# Patient Record
Sex: MALE | Race: WHITE | NOT HISPANIC OR LATINO
[De-identification: names, ages, dates, MRNs, and addresses within clinical notes are randomized per-mention and may not be internally consistent; named-entity substitution may affect disease eponyms.]

---
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
## 2025-01-24 ENCOUNTER — HOSPITAL ENCOUNTER (EMERGENCY)
Facility: HOSPITAL | Age: 20
Discharge: HOME | End: 2025-01-24
Attending: EMERGENCY MEDICINE
Payer: COMMERCIAL

## 2025-01-24 ENCOUNTER — APPOINTMENT (OUTPATIENT)
Dept: GENERAL RADIOLOGY | Facility: HOSPITAL | Age: 20
End: 2025-01-24
Attending: EMERGENCY MEDICINE
Payer: COMMERCIAL

## 2025-01-24 VITALS
DIASTOLIC BLOOD PRESSURE: 85 MMHG | TEMPERATURE: 98 F | HEIGHT: 63 IN | SYSTOLIC BLOOD PRESSURE: 139 MMHG | BODY MASS INDEX: 30.12 KG/M2 | OXYGEN SATURATION: 97 % | HEART RATE: 107 BPM | WEIGHT: 170 LBS | RESPIRATION RATE: 16 BRPM

## 2025-01-24 DIAGNOSIS — F41.9 ANXIETY: ICD-10-CM

## 2025-01-24 DIAGNOSIS — R07.9 CHEST PAIN OF UNCERTAIN ETIOLOGY: Primary | ICD-10-CM

## 2025-01-24 DIAGNOSIS — G47.00 INSOMNIA, UNSPECIFIED TYPE: ICD-10-CM

## 2025-01-24 LAB
ALBUMIN SERPL-MCNC: 5 G/DL (ref 3.2–4.8)
ALBUMIN/GLOB SERPL: 1.3 {RATIO} (ref 1–2)
ALP LIVER SERPL-CCNC: 95 U/L
ALT SERPL-CCNC: 60 U/L
AMPHET UR QL SCN: NEGATIVE
ANION GAP SERPL CALC-SCNC: 12 MMOL/L (ref 0–18)
AST SERPL-CCNC: 40 U/L (ref ?–34)
BASOPHILS # BLD AUTO: 0.06 X10(3) UL (ref 0–0.2)
BASOPHILS NFR BLD AUTO: 0.4 %
BENZODIAZ UR QL SCN: NEGATIVE
BILIRUB SERPL-MCNC: 1.4 MG/DL (ref 0.3–1.2)
BILIRUB UR QL STRIP.AUTO: NEGATIVE
BUN BLD-MCNC: 12 MG/DL (ref 9–23)
CALCIUM BLD-MCNC: 10 MG/DL (ref 8.7–10.6)
CHLORIDE SERPL-SCNC: 101 MMOL/L (ref 98–112)
CLARITY UR REFRACT.AUTO: CLEAR
CO2 SERPL-SCNC: 25 MMOL/L (ref 21–32)
COCAINE UR QL: NEGATIVE
COLOR UR AUTO: YELLOW
CREAT BLD-MCNC: 0.87 MG/DL
CREAT UR-SCNC: 271.3 MG/DL
D DIMER PPP FEU-MCNC: <0.27 UG/ML FEU (ref ?–0.5)
EGFRCR SERPLBLD CKD-EPI 2021: 126 ML/MIN/1.73M2 (ref 60–?)
EOSINOPHIL # BLD AUTO: 0.07 X10(3) UL (ref 0–0.7)
EOSINOPHIL NFR BLD AUTO: 0.5 %
ERYTHROCYTE [DISTWIDTH] IN BLOOD BY AUTOMATED COUNT: 12.6 %
ETHANOL SERPL-MCNC: <3 MG/DL (ref ?–3)
FENTANYL UR QL SCN: NEGATIVE
GLOBULIN PLAS-MCNC: 3.8 G/DL (ref 2–3.5)
GLUCOSE BLD-MCNC: 83 MG/DL (ref 70–99)
GLUCOSE UR STRIP.AUTO-MCNC: NORMAL MG/DL
HCT VFR BLD AUTO: 45.4 %
HGB BLD-MCNC: 15.6 G/DL
IMM GRANULOCYTES # BLD AUTO: 0.05 X10(3) UL (ref 0–1)
IMM GRANULOCYTES NFR BLD: 0.4 %
KETONES UR STRIP.AUTO-MCNC: 60 MG/DL
LEUKOCYTE ESTERASE UR QL STRIP.AUTO: NEGATIVE
LYMPHOCYTES # BLD AUTO: 2.94 X10(3) UL (ref 1–4)
LYMPHOCYTES NFR BLD AUTO: 20.9 %
MCH RBC QN AUTO: 27.9 PG (ref 26–34)
MCHC RBC AUTO-ENTMCNC: 34.4 G/DL (ref 31–37)
MCV RBC AUTO: 81.2 FL
MDMA UR QL SCN: NEGATIVE
MONOCYTES # BLD AUTO: 0.95 X10(3) UL (ref 0.1–1)
MONOCYTES NFR BLD AUTO: 6.7 %
NEUTROPHILS # BLD AUTO: 10.03 X10 (3) UL (ref 1.5–7.7)
NEUTROPHILS # BLD AUTO: 10.03 X10(3) UL (ref 1.5–7.7)
NEUTROPHILS NFR BLD AUTO: 71.1 %
NITRITE UR QL STRIP.AUTO: NEGATIVE
OPIATES UR QL SCN: NEGATIVE
OSMOLALITY SERPL CALC.SUM OF ELEC: 285 MOSM/KG (ref 275–295)
OXYCODONE UR QL SCN: NEGATIVE
PH UR STRIP.AUTO: 5.5 [PH] (ref 5–8)
PLATELET # BLD AUTO: 426 10(3)UL (ref 150–450)
POTASSIUM SERPL-SCNC: 3.5 MMOL/L (ref 3.5–5.1)
PROT SERPL-MCNC: 8.8 G/DL (ref 5.7–8.2)
PROT UR STRIP.AUTO-MCNC: 20 MG/DL
RBC # BLD AUTO: 5.59 X10(6)UL
RBC UR QL AUTO: NEGATIVE
SODIUM SERPL-SCNC: 138 MMOL/L (ref 136–145)
SP GR UR STRIP.AUTO: >1.03 (ref 1–1.03)
TROPONIN I SERPL HS-MCNC: 11 NG/L
UROBILINOGEN UR STRIP.AUTO-MCNC: 2 MG/DL
WBC # BLD AUTO: 14.1 X10(3) UL (ref 4–11)

## 2025-01-24 PROCEDURE — 85379 FIBRIN DEGRADATION QUANT: CPT | Performed by: EMERGENCY MEDICINE

## 2025-01-24 PROCEDURE — 90791 PSYCH DIAGNOSTIC EVALUATION: CPT

## 2025-01-24 PROCEDURE — 93005 ELECTROCARDIOGRAM TRACING: CPT

## 2025-01-24 PROCEDURE — 80307 DRUG TEST PRSMV CHEM ANLYZR: CPT | Performed by: EMERGENCY MEDICINE

## 2025-01-24 PROCEDURE — 85025 COMPLETE CBC W/AUTO DIFF WBC: CPT | Performed by: EMERGENCY MEDICINE

## 2025-01-24 PROCEDURE — 99285 EMERGENCY DEPT VISIT HI MDM: CPT

## 2025-01-24 PROCEDURE — 82077 ASSAY SPEC XCP UR&BREATH IA: CPT | Performed by: EMERGENCY MEDICINE

## 2025-01-24 PROCEDURE — 81001 URINALYSIS AUTO W/SCOPE: CPT | Performed by: EMERGENCY MEDICINE

## 2025-01-24 PROCEDURE — 99284 EMERGENCY DEPT VISIT MOD MDM: CPT

## 2025-01-24 PROCEDURE — 84484 ASSAY OF TROPONIN QUANT: CPT | Performed by: EMERGENCY MEDICINE

## 2025-01-24 PROCEDURE — 93010 ELECTROCARDIOGRAM REPORT: CPT

## 2025-01-24 PROCEDURE — 71045 X-RAY EXAM CHEST 1 VIEW: CPT | Performed by: EMERGENCY MEDICINE

## 2025-01-24 PROCEDURE — 80053 COMPREHEN METABOLIC PANEL: CPT | Performed by: EMERGENCY MEDICINE

## 2025-01-24 PROCEDURE — 36415 COLL VENOUS BLD VENIPUNCTURE: CPT

## 2025-01-25 NOTE — BH LEVEL OF CARE ASSESSMENT
Crisis Evaluation Assessment    Troy Roque YOB: 2005   Age 19 year old MRN [id number]   [facility] EMERGENCY DEPARTMENT Attending No att. providers found      Patient's legal sex: male  Patient identifies as: male  Patient's birth sex: male  Preferred pronouns: He/Him    Date of Service: 1/24/2025    Referral Source:  Referral Source  Where was crisis eval performed?: On-site  Referral Source: Self-Referral/Former Patient/Returning Patient  Referral Source Info: Pt reports he voluntarily came to [facility] at the recommendation of his therapist    Reason for Crisis Evaluation   Pt is a 19-year-old male presenting with insomnia and anxiety.      Collateral  Per chart review, pt called [facility] earlier reporting stress causing a disruption in sleep and ultimately an inability to sleep, reporting he had not slept in 3 days due to increased stress.        Suicide Crisis Syndrome:  Suicide Crisis Syndrome  Do you feel trapped with no good options left?: No  Are you overwhelmed, or have you lost control by negative thoughts filling your head? : No    Suicide Risk Screening:  Source of information for CSSR: Patient  In what setting is the screener performed?: in person  1. Have you wished you were dead or wished you could go to sleep and not wake up? (past 30 days): No  2. Have you actually had any thoughts of killing yourself? (past 30 days): No              6. Have you ever done anything, started to do anything, or prepared to do anything to end your life? (lifetime): No     Score - BH OV: No Risk    Suicide Risk Assessments:  Suicidal Thoughts, Plan and Intent (this information to be used in conjunction with CSSR-S Suicide Screening)  Describe thoughts, ideation and intent:: Pt denies  Frequency: How many times have you had these thoughts?: Other (comment) (Pt denies)  Duration: When you have the thoughts, how long do they last?: Other (comment) (Pt denies)  Controllability: Could/can you stop  thinking about killing yourself or wanting to die if you want to?: Other (comment) (Pt denies)  Identify Risk Factors  Do you have access to lethal methods to attempt suicide?: No  Clinical Status:: Agitation or severe anxiety;Perceived burden on family or others;Insomnia  Activating Events/Recent Stressors:: Significant negative event(s) (legal, financial, relationship, etc.)  Identify Protective Factors  Internal: Ability to cope with stress;Frustration tolerance;Identifies reasons for living  External: Supportive social network of family or friends;Responsibility to family or others, living with family;Engaged in work or school  Risk Stratification  Risk Level: Low  Active thoughts: Pt denies SI, reports no hx of suicidal ideation.  Helpless/Hopeless/Worthless: Pt denies.  Significant losses: Pt denies.  Stressors: Pt reports his parents        Non-Suicidal Self-Injury:   Pt denies.        Risk to Others  Aggression/Agitation: Pt denies.  Destruction of property: Pt denies.  HI: Pt denies.        Access to Means:  Access to Means  Has access to means to attempt suicide, self-injure, harm others, or damage property?: No  Access to Firearm/Weapon: No  Do you have a firearm owner identification (FOID) card?: No        Protective Factors:   Pt reports his boyfriend.        Review of Psychiatric Systems:  Depression: Pt denies.  Anxiety: Pt reports heightened anxiety regarding his home life and his relationship with his family.  Pt reports he plans on moving out soon to live with his boyfriend which will decrease his anxiety significantly.  Psychosis: Pt denies.  Pt reports he went to [facility] a few years back after his family reported he was experiencing delusions regardless of what he had to say.  Pt denies joseph, denies AVH.  Sleep: Pt reports poor sleep, reporting he has not slept in 3 days and the last time he did sleep was for 5 hours.  Pt reports increased stressors at home with arguments and his alcoholic  parents cause his mind to race and he struggles to relax after that.  Appetite: Pt denies any appetite disturbances.        Substance Use:  Pt denies.        Functional Achievement:   Work: Pt reports he is an .  Home: Pt is able to perform own ADL's.  Pt denies issues caring for himself but reports due to lack of sleep he has been struggling to complete them, as quick as he used to but overall he still cares hor his hygiene and day to day activities.          Ability to Care for Self::   See above.        Current Treatment and Treatment History:  Prior diagnoses:  -Bipolar Disorder    Inpatient hx:  -JASON~2022~Delusional Disorder    Outpatient hx:  -Pt denies    Therapist:  -Pt reports he see's a therapist through [facility]    Psychiatrist:  -Pt denies    Medications:  -Pt denies        School/Work Performance:  See above.        Relevant Social History:  Family hx: Pt reports alcoholism with his parents  Trauma/Abuse hx: Pt denies  Marital status/Children: Pt reports he has a boyfriend, denies any children  Living situation: Pt reports he currently resides with his parents but will soon be moving out into an apartment  Legal hx: Pt denies.        Iglesia and Complex (as applicable):  N/A    Current Medical (as applicable):       EDP Assessment (as applicable):  IBW Calculations  Weight: 170 lb  BMI (Calculated): 30.1  IBW LBS [place]: 124 LBS  IBW %: 137.1 %  IBW + 10%: 136.4 LBS  IBW - 10%: 111.6 LBS    Abuse Assessment:  Abuse Assessment  Physical Abuse: Denies  Verbal Abuse: Denies  Sexual Abuse: Denies  Neglect: Denies  Does anyone say or do something to you that makes you feel unsafe?: No  Have You Ever Been Harmed by a Partner/Caregiver?: No  Health Concerns r/t Abuse: No  Possible Abuse Reportable to:: Not appropriate for reporting to authorities    Mental Status Exam:   General Appearance  Characteristics: Appropriate clothing;Good hygiene  Eye Contact: Direct  Psychomotor Behavior  Gait/Movement:  Normal;Steady;Coordinated  Abnormal movements: None  Posture: Relaxed  Rate of Movement: Normal  Mood and Affect  Mood or Feelings: Calm;Content  Appropriateness of Affect: Congruent to mood;Appropriate to situation  Range of Affect: Normal  Stability of Affect: Stable  Attitude toward staff: Co-operative;Open;Friendly  Speech  Rate of Speech: Appropriate  Flow of Speech: Appropriate  Intensity of Volume: Ordinary  Cognition  Concentration: Unimpaired  Memory: Recent memory intact;Remote memory intact  Orientation Level: Oriented X4;Appropriate for developmental age  Insight: Good  Judgment: Good  Thought Patterns  Clarity/Relevance: Coherent;Logical;Relevant to topic  Flow: Organized  Content: Ordinary  Level of Consciousness: Alert  Level of Consciousness: Alert  Behavior  Exhibited behavior: Participated      Disposition:  Writer discussed with Dr. Mitchell, pt's attending ER MD, regarding pt's presenting criteria and is in agreement with pt discharging once medically cleared.    Rationale for Treatment Recommendation:   Pt is a 19-year-old male presenting with increased anxiety and stress causing an impact on his ability to sleep, reporting he has not slept in 72 hours however prior to that he only sleeps about 5 hours a night.  Pt reports he discussed this with his therapist who recommended he go to [facility] for a level of care assessment to see if any additional resources could be provided or medication could be administered for sleep.  Pt denies SI/HI/AVH/SIB.      Level of Care Recommendations  Consulted with: Dr. Mitchell  Level of Care Recommendation: Outpatient  Outpatient Criteria: Regular therapy needed;Support needed  Outpatient Recommendations: Medication management;Therapy  Refused Treatment: No  Education Provided: Call 911 in an Emergency;Advised to call if condition worsens;Advised to call with questions;[facility] Crisis Line Number  Transferred: No  Sign-In  Patient Verbalized Understanding:  Yes      Diagnoses with F-Codes:  Primary Psychiatric Diagnosis  F41.1 Generalized Anxiety Disorder     Secondary Psychiatric Diagnoses  Deferred   Pervasive Diagnoses (as applicable)  Deferred   Pertinent Non-Psychiatric Diagnoses  Deferred         Yanet LONGORIA LPC

## 2025-01-25 NOTE — ED INITIAL ASSESSMENT (HPI)
Pt states that he has been feeling stressed since 2024, having familial problems at home that has been making it harder for him to calm down. Pt hasn't slept in the past 24 hours, and experiencing racing thoughts and high anxiety causing chest pressure per pt. Pt presents with flight of ideas, and paranoid thinking, family accompanied as pt is a poor historian

## 2025-01-25 NOTE — ED PROVIDER NOTES
Patient Seen in: [facility] Emergency Department      History     Chief Complaint   Patient presents with    Eval-P    Chest Pain Angina    Medical Clearance     Stated Complaint: needs medical clearance for [facility], c/o CP    Subjective:   HPI      19-year-old male presents today for evaluation.  Patient lives with his parents which she states caused him a great deal of stress.  Because of stress, he states he has been having issues with insomnia.  Usually when he smokes marijuana, this helps.  He denies any other significant alcohol or drug use.  He spoke with his counselor today and went to  [facility] for assessment.  While he was there, he became panicked because of her previous behavioral health experience and began feeling a chest pain.  Since, his chest pain is completely resolved.  He is presently asymptomatic.  He denies any SI or HI.  He has no fevers, cough or leg swelling.    Objective:     No pertinent past medical history.            No pertinent past surgical history.              No pertinent social history.                Physical Exam     ED Triage Vitals [01/24/25 2015]   /85   Pulse 107   Resp 16   Temp 98 °F (36.7 °C)   Temp src    SpO2 97 %   O2 Device None (Room air)       Current Vitals:   Vital Signs  BP: 139/85  Pulse: 107  Resp: 16  Temp: 98 °F (36.7 °C)    Oxygen Therapy  SpO2: 97 %  O2 Device: None (Room air)        Physical Exam  Vitals and nursing note reviewed.   Constitutional:       Appearance: Normal appearance.   HENT:      Head: Normocephalic.      Nose: Nose normal.      Mouth/Throat:      Mouth: Mucous membranes are moist.   Eyes:      Extraocular Movements: Extraocular movements intact.   Cardiovascular:      Rate and Rhythm: Normal rate and regular rhythm.   Pulmonary:      Effort: Pulmonary effort is normal.      Breath sounds: Normal breath sounds.   Abdominal:      General: Abdomen is flat.   Musculoskeletal:         General: Normal range of motion.    Skin:     General: Skin is warm.   Neurological:      General: No focal deficit present.      Mental Status: He is alert.   Psychiatric:         Mood and Affect: Mood normal.     \    ED Course     Labs Reviewed   CBC WITH DIFFERENTIAL WITH PLATELET - Abnormal; Notable for the following components:       Result Value    WBC 14.1 (*)     Neutrophil Absolute Prelim 10.03 (*)     Neutrophil Absolute 10.03 (*)     All other components within normal limits   COMP METABOLIC PANEL (14) - Abnormal; Notable for the following components:    AST 40 (*)     ALT 60 (*)     Bilirubin, Total 1.4 (*)     Total Protein 8.8 (*)     Albumin 5.0 (*)     Globulin  3.8 (*)     All other components within normal limits   URINALYSIS, ROUTINE - Abnormal; Notable for the following components:    Spec Gravity >1.030 (*)     Ketones Urine 60 (*)     Protein Urine 20 (*)     Urobilinogen Urine 2 (*)     All other components within normal limits   DRUG SCREEN 8 W/OUT CONFIRMATION, URINE - Abnormal; Notable for the following components:    Cannabinoid Urine Presumed Positive (*)     All other components within normal limits    Narrative:     Results of the Urine Drug Screen should be used only for medical purposes.   TROPONIN I HIGH SENSITIVITY - Normal   ETHYL ALCOHOL - Normal   D-DIMER - Normal   RAINBOW DRAW LAVENDER   RAINBOW DRAW LIGHT GREEN   RAINBOW DRAW BLUE     EKG    Rate, intervals and axes as noted on EKG Report.  Rate: 99  Rhythm: Sinus Rhythm  Reading: no stemi                XR CHEST AP PORTABLE  (CPT=71045)    Result Date: 1/24/2025  PROCEDURE:  XR CHEST AP PORTABLE  (CPT=71045)  TECHNIQUE:  AP chest radiograph was obtained.  COMPARISON:  None.  INDICATIONS:  needs medical clearance for [facility], c/o CP  PATIENT STATED HISTORY: (As transcribed by Technologist)  Patient offered no additional history at this time.     FINDINGS:  The heart is normal in size.  The lungs are clear.  There are no pleural effusions.  The bones are  unremarkable.            CONCLUSION:  No acute disease.    LOCATION:  [facility]      Dictated by (CST): Bob Correa MD on 1/24/2025 at 9:41 PM     Finalized by (CST): Bob Correa MD on 1/24/2025 at 9:42 PM             [facility]      Differential Diagnosis  19-year-old male presents today for evaluation.  He was seen at [facility].  I was informed that he was sent here for evaluation of his chest pain.  He was evaluated by our behavioral specialist, he was cleared for discharge from a psychiatric standpoint.  He denies any SI or HI.  He is currently asymptomatic.  Plan for chest x-ray assess for signs of consolidation, edema, effusion or pneumothorax.  Will obtain labs assess for signs of myocardial injury or pulmonary embolism.  Will reassess.    11:21 pm  Patient's ED workup does not demonstrate acute abnormality.  He informed staff multiple times that he would like to leave.  I reviewed findings with patient.  With him being asymptomatic with a reassuring workup, along with clearance from behavioral health, I feel he stable for discharge home.  I advised continued outpatient care with his therapist and her primary care doctor for reassessment.  He is comfortable with plan, will DC at his request.    Patient's LFTs are elevated today.  I advised that he have them monitored by his primary care doctor and discussed the possibility of fatty liver.    Discussions of Management  Case reviewed with our crisis worker        Medical Decision Making      Disposition and Plan     Clinical Impression:  1. Chest pain of uncertain etiology    2. Anxiety    3. Insomnia, unspecified type         Disposition:  Discharge  1/24/2025 11:22 pm    Follow-up:  [address] [id number]  Call in 1 day(s)      Andrew Mack MD  [address] [place] [id number] [phone]    Call in 1 day(s)            Medications Prescribed:  There are no discharge medications for this  patient.          Supplementary Documentation:

## 2025-01-25 NOTE — DISCHARGE INSTRUCTIONS
Follow-up with your therapist or [facility] for continued care.  Follow-up with a primary care doctor in the next week for reassessment.  Have them continue to observe your liver test.  Return to the ER for any new or worsening pain or other concerning symptoms.

## 2025-01-26 PROBLEM — F31.13 BIPOLAR DISORDER, CURRENT EPISODE MANIC, SEVERE (HCC): Status: ACTIVE | Noted: 2025-01-26

## 2025-01-26 LAB
ATRIAL RATE: 99 BPM
P AXIS: -7 DEGREES
P-R INTERVAL: 120 MS
Q-T INTERVAL: 348 MS
QRS DURATION: 88 MS
QTC CALCULATION (BEZET): 446 MS
R AXIS: 66 DEGREES
T AXIS: 38 DEGREES
VENTRICULAR RATE: 99 BPM

## 2025-01-27 PROBLEM — F31.2 BIPOLAR AFFECTIVE DISORDER, CURRENT EPISODE MANIC WITH PSYCHOTIC SYMPTOMS (HCC): Status: ACTIVE | Noted: 2025-01-26

## 2025-01-27 PROBLEM — F41.1 GAD (GENERALIZED ANXIETY DISORDER): Status: ACTIVE | Noted: 2025-01-27

## 2025-01-27 PROBLEM — F12.20 CANNABIS USE DISORDER, MODERATE, DEPENDENCE (HCC): Status: ACTIVE | Noted: 2025-01-27

## 2025-02-11 PROBLEM — F31.13 BIPOLAR DISORDER, CURRENT EPISODE MANIC, SEVERE (HCC): Status: ACTIVE | Noted: 2025-02-11

## 2025-02-12 PROBLEM — F31.13 BIPOLAR DISORDER, CURRENT EPISODE MANIC, SEVERE (HCC): Status: RESOLVED | Noted: 2025-02-11 | Resolved: 2025-02-12

## 2025-03-14 ENCOUNTER — LAB ENCOUNTER (OUTPATIENT)
Dept: LAB | Age: 20
End: 2025-03-14
Attending: Other
Payer: COMMERCIAL

## 2025-03-14 DIAGNOSIS — Z13.89 SCREENING FOR SUBSTANCE ABUSE: ICD-10-CM

## 2025-03-14 DIAGNOSIS — F31.2 BIPOLAR DISORDER, CURRENT EPISODE MANIC SEVERE WITH PSYCHOTIC FEATURES (HCC): ICD-10-CM

## 2025-03-14 LAB — LITHIUM SERPL-SCNC: 0.8 MMOL/L (ref 0.6–1.2)

## 2025-03-14 PROCEDURE — 80178 ASSAY OF LITHIUM: CPT

## 2025-03-14 PROCEDURE — 36415 COLL VENOUS BLD VENIPUNCTURE: CPT

## 2025-05-15 NOTE — ED QUICK NOTES
Pt called for my registration. Pt not in waiting room.   Pt seen walking back inside from vaping.

## 2025-05-15 NOTE — ED PROVIDER NOTES
Patient Seen in: [facility] Emergency Department      History     Chief Complaint   Patient presents with    Insomnia     Stated Complaint: Insomnia x 3 days, wants a sleep study    Subjective:   HPI  History of Present Illness            20-year-old male presenting for multiple complaints.  Patient has a history of bipolar disorder he says he is taking his medications as prescribed he says that he is not sleeping very well he denies suicidal homicidal ideation.  He states he is currently homeless he would like just a place to sleep incidentally he says he if he can get an MRI of his brain because he is always walking fast and some he said he should get an evaluation of his brain but also he says he would like a sleep study because he is not sleeping as long as he can remember.  He denies any other exacerbating relieving factors or associated symptoms.      Objective:     Past Medical History:    Bipolar 1 disorder (HCC)    Depression    History of joseph              Past Surgical History:   Procedure Laterality Date    Other surgical history Right     collar bone                Social History     Socioeconomic History    Marital status:  (Not Legally)   Tobacco Use    Smoking status: Every Day     Types: Cigarettes    Smokeless tobacco: Never   Vaping Use    Vaping status: Every Day    Substances: Nicotine, THC    Devices: Disposable   Substance and Sexual Activity    Alcohol use: Not Currently     Comment: monthly, socially, notes amount varies based on those drinking around him. Pt states last drink was on march 28th.    Drug use: Yes     Types: Cannabis     Comment: Uses marijuana weekly via edible, notes will take \"a lot,\" unable to get onset     Social Drivers of Health     Food Insecurity: No Food Insecurity (3/31/2025)    NCSS - Food Insecurity     Worried About Running Out of Food in the Last Year: No     Ran Out of Food in the Last Year: No   Transportation Needs: Unmet Transportation Needs  (3/31/2025)    NCSS - Transportation     Lack of Transportation: Yes   Housing Stability: At Risk (3/31/2025)    NCSS - Housing/Utilities     Has Housing: No     Worried About Losing Housing: No     Unable to Get Utilities: No                                Physical Exam     ED Triage Vitals [05/14/25 2246]   /85   Pulse 91   Resp 18   Temp 98.1 °F (36.7 °C)   Temp src Temporal   SpO2 96 %   O2 Device None (Room air)       Current Vitals:   Vital Signs  BP: 129/85  Pulse: 91  Resp: 18  Temp: 98.1 °F (36.7 °C)  Temp src: Temporal    Oxygen Therapy  SpO2: 96 %  O2 Device: None (Room air)          Physical Exam     Awake alert patient appears in no distress HEENT exam is normal lungs are clear cardiovascular exam regular rhythm abdomen soft nontender extremities no carcinosis or edema no rash back exam is normal skin is nondiaphoretic        ED Course     Labs Reviewed   COMP METABOLIC PANEL (14) - Abnormal; Notable for the following components:       Result Value    Glucose 102 (*)     All other components within normal limits   CBC WITH DIFFERENTIAL WITH PLATELET - Abnormal; Notable for the following components:    WBC 11.8 (*)     Neutrophil Absolute Prelim 8.49 (*)     Neutrophil Absolute 8.49 (*)     All other components within normal limits   LITHIUM (ESKALITH) - Abnormal; Notable for the following components:    Lithium 0.4 (*)     All other components within normal limits   ACETAMINOPHEN (TYLENOL), S - Abnormal; Notable for the following components:    Acetaminophen <2.0 (*)     All other components within normal limits   SALICYLATE, SERUM - Abnormal; Notable for the following components:    Salicylate <3.0 (*)     All other components within normal limits   ETHYL ALCOHOL - Normal   DRUG SCREEN 8 W/OUT CONFIRMATION, URINE   RAINBOW DRAW BLUE   SARS-COV-2 BY PCR (GENEXPERT)          Results            Differential diagnosis includes bipolar disorder joseph                    MDM              Medical  Decision Making  21-year-old male presenting for difficulty sleeping.  Patient has already been evaluated by crisis prior to his arrival here in the emergency department he states that he would just like a place to sleep since he is hopeless.  He will be discharged to home and is to return to the emergency department for worsening symptoms or complaints    Problems Addressed:  Homelessness: acute illness or injury    Amount and/or Complexity of Data Reviewed  Labs: ordered. Decision-making details documented in ED Course.  Radiology: ordered and independent interpretation performed. Decision-making details documented in ED Course.        Disposition and Plan     Clinical Impression:  1. Homelessness         Disposition:  There is no disposition on file for this visit.  There is no disposition time on file for this visit.    Follow-up:  No follow-up provider specified.        Medications Prescribed:  Current Discharge Medication List                Supplementary Documentation:

## 2025-05-15 NOTE — ED QUICK NOTES
Pt asked about pain, patient stated \"yeah I got a headache from not hitting my vape. That's what I wanted to charge in the waiting room.\" Pt told he cannot vape in the building and he stated \"yeah I know, but that way its fully charged when I get out.\"

## 2025-05-15 NOTE — ED QUICK NOTES
Pt was asked CSSRS questions. Pt asked if he was suicidal or had thoughts of wanting to harm himself, pt stated \"When? Since my assessment\".   Nurse clarified and asked if he had any thoughts of wanting to harm himself today. Pt stated \"no\".

## 2025-05-15 NOTE — ED INITIAL ASSESSMENT (HPI)
Pt reports from home stating \"I want a sleep study done, I called and talke to [facility] and they say they don't have any openings right now but if I go in to the ER and am assessed then by plan of getting in by Saturday will work perfectly\". Wants a sleep study done due to \"friend\" saying he should get it done Recently kicked out of house and \"can't sleep'.   \"Looking for a place to sleep\". [facility] told him if he went to ER wed have a bed for him to sleep in.

## 2025-05-16 NOTE — ED QUICK NOTES
Pt, has been given medication prescription to help him through  Pt, given discharge instructions   Pt, has no new acute needs at this time  Pt, able to ambulate out of ED

## 2025-05-16 NOTE — ED INITIAL ASSESSMENT (HPI)
Pt wants all of his medications refilled such as lithium, risperdone, melatonin and nicotine gum. Pt left his medications at a friends house last night and his friend is out of town and unable to get his medications. Pt is homeless.

## 2025-05-16 NOTE — DISCHARGE INSTRUCTIONS
Follow-up with your primary care physician and mental health team, return to ER for any further problems

## 2025-05-17 NOTE — ED PROVIDER NOTES
Patient Seen in: [facility] Emergency Department      History     Chief Complaint   Patient presents with    Medication Administration     Stated Complaint: wants med refill    Subjective:   HPI  History of Present Illness            This is a 20-year-old male, history of bipolar, depression presents emergency room requesting medication refill, states that he left his backpack with all of his medication at a friend's house where he stayed last night, states he left to go to an appointment this morning and forgot his back.  Patient reports that his friend left to go to [place] for a few days for vacation, patient is requesting a few days of medication until his friend gets back.  Patient denies suicidal homicidal ideations denies hallucinations.  Patient also reports that he has had a rash to the groin area which has been itchy over the last week or so.  Denies any penile lesions or discharge.      Objective:     Past Medical History:    Bipolar 1 disorder (HCC)    Depression    History of joseph              Past Surgical History:   Procedure Laterality Date    Other surgical history Right     collar bone                Social History     Socioeconomic History    Marital status: Single   Tobacco Use    Smoking status: Every Day     Types: Cigarettes    Smokeless tobacco: Never   Vaping Use    Vaping status: Every Day    Substances: Nicotine, THC    Devices: Disposable   Substance and Sexual Activity    Alcohol use: Not Currently     Comment: monthly, socially, notes amount varies based on those drinking around him. Pt states last drink was on march 28th.    Drug use: Yes     Types: Cannabis     Comment: Uses marijuana weekly via edible, notes will take \"a lot,\" unable to get onset     Social Drivers of Health     Food Insecurity: No Food Insecurity (3/31/2025)    NCSS - Food Insecurity     Worried About Running Out of Food in the Last Year: No     Ran Out of Food in the Last Year: No   Transportation Needs:  Unmet Transportation Needs (3/31/2025)    NCSS - Transportation     Lack of Transportation: Yes   Housing Stability: At Risk (3/31/2025)    NCSS - Housing/Utilities     Has Housing: No     Worried About Losing Housing: No     Unable to Get Utilities: No                                Physical Exam     ED Triage Vitals   BP 05/16/25 1656 131/80   Pulse 05/16/25 1655 99   Resp 05/16/25 1655 20   Temp 05/16/25 1655 (!) 96.3 °F (35.7 °C)   Temp src 05/16/25 1655 Temporal   SpO2 05/16/25 1655 95 %   O2 Device 05/16/25 1655 None (Room air)       Current Vitals:   Vital Signs  BP: 128/75  Pulse: 96  Resp: 16  Temp: 97 °F (36.1 °C)  Temp src: Temporal  MAP (mmHg): 97    Oxygen Therapy  SpO2: 97 %  O2 Device: None (Room air)          Physical Exam  GENERAL: Patient is awake, alert, well-appearing, in no acute distress.  HEENT:no scleral icterus.  Mucous membranes are moist, oropharynx is clear  HEART: Regular rate and rhythm, no murmurs.  LUNGS: Clear to auscultation bilaterally.  No Rales, no rhonchi, no wheezing, no stridor.  ABDOMEN: Soft, nondistended,non tender   exam: To the bilateral inguinal region/groin there is a erythematous rash with satellite lesions consistent with tinea, there is no pustules, no vesicles, no drainage  EXTREMITIES: No peripheral edema, no calf tenderness        ED Course   Labs Reviewed - No data to display       Results                              MDM        Differential diagnosis before testing includes but not limited to tinea cruris, cellulitis, contact dermatitis, which is a medical condition that poses a threat to life/function    Course of Events during Emergency Room Visit include patient's rash is consistent with tinea cruris, will prescribe antifungal ointment/Lotrimin.  Patient also is requesting refill of his medication which includes lithium and risperidone.  Patient will be given prescription for 1 week, continue to follow-up with his mental health team, follow-up with primary  care, return to ER for any change with symptoms.  Patient well-appearing agrees plan discharge condition    Shared decision making was utilized           Disposition:    Discharge  I have discussed with the patient the results of test, differential diagnosis, treatment plan, warning signs and symptoms which should prompt immediate return.  They expressed understanding of these instructions and agrees to the following plan provided.  They were given written discharge instructions and agrees to return for any concerns and voiced understanding and all questions were answered.    Note to patient: The 21st Century Cures Act makes medical notes like these available to patients in the interest of transparency. However, this is a medical document intended as peer to peer communication. It is written in medical language and may contain abbreviations or verbiage that are unfamiliar. It may appear blunt or direct. Medical documents are intended to carry relevant information, facts as evident, and the clinical opinion of the practitioner.                 Medical Decision Making      Disposition and Plan     Clinical Impression:  1. Tinea cruris    2. Encounter for medication refill         Disposition:  Discharge  5/16/2025  6:34 pm    Follow-up:  Nellie Lopez MD  [address] [address]  [phone]    Follow up in 2 day(s)            Medications Prescribed:  Discharge Medication List as of 5/16/2025  6:37 PM        START taking these medications    Details   clotrimazole (LOTRIMIN AF) 1 % External Cream Apply 1 Application topically 2 (two) times daily for 10 days., Normal, Disp-12 g, R-0      !! lithium  MG Oral Tab CR Take 1 tablet (450 mg total) by mouth at bedtime for 7 days., Normal, Disp-7 tablet, R-0      !! lithium  MG Oral Tab CR Take 2 tablets (600 mg total) by mouth at bedtime for 7 days., Normal, Disp-14 tablet, R-0      !! risperidone (RISPERDAL) 4 MG Oral Tab Take 1 tablet  (4 mg total) by mouth at bedtime for 7 days., Normal, Disp-7 tablet, R-0      !! risperiDONE 1 MG Oral Tab Take 1 tablet (1 mg total) by mouth at bedtime for 7 days., Normal, Disp-7 tablet, R-0       !! - Potential duplicate medications found. Please discuss with provider.                Supplementary Documentation: